# Patient Record
Sex: FEMALE | Race: WHITE | NOT HISPANIC OR LATINO | Employment: FULL TIME | URBAN - METROPOLITAN AREA
[De-identification: names, ages, dates, MRNs, and addresses within clinical notes are randomized per-mention and may not be internally consistent; named-entity substitution may affect disease eponyms.]

---

## 2018-09-24 ENCOUNTER — OFFICE VISIT (OUTPATIENT)
Dept: FAMILY MEDICINE | Facility: CLINIC | Age: 33
End: 2018-09-24

## 2018-09-24 VITALS
DIASTOLIC BLOOD PRESSURE: 72 MMHG | RESPIRATION RATE: 16 BRPM | SYSTOLIC BLOOD PRESSURE: 108 MMHG | BODY MASS INDEX: 26.23 KG/M2 | OXYGEN SATURATION: 98 % | HEART RATE: 76 BPM | TEMPERATURE: 96.8 F | HEIGHT: 65 IN | WEIGHT: 157.4 LBS

## 2018-09-24 DIAGNOSIS — G47.9 SLEEP DISORDER: Primary | ICD-10-CM

## 2018-09-24 DIAGNOSIS — F32.0 MAJOR DEPRESSIVE DISORDER, SINGLE EPISODE, MILD (H): ICD-10-CM

## 2018-09-24 DIAGNOSIS — Z71.89 ACP (ADVANCE CARE PLANNING): ICD-10-CM

## 2018-09-24 LAB
% GRANULOCYTES: 42.2 %
HCT VFR BLD AUTO: 35.4 % (ref 35–47)
HEMOGLOBIN: 11.8 G/DL (ref 11.7–15.7)
LYMPHOCYTES NFR BLD AUTO: 48.9 %
MCH RBC QN AUTO: 29.1 PG (ref 26–33)
MCHC RBC AUTO-ENTMCNC: 33.3 G/DL (ref 31–36)
MCV RBC AUTO: 87.5 FL (ref 78–100)
MONOCYTES NFR BLD AUTO: 8.9 %
PLATELET COUNT - QUEST: 311 10^9/L (ref 150–375)
RBC # BLD AUTO: 4.05 10*12/L (ref 3.8–5.2)
WBC # BLD AUTO: 4.9 10*9/L (ref 4–11)

## 2018-09-24 PROCEDURE — 85025 COMPLETE CBC W/AUTO DIFF WBC: CPT | Performed by: FAMILY MEDICINE

## 2018-09-24 PROCEDURE — 36415 COLL VENOUS BLD VENIPUNCTURE: CPT | Performed by: FAMILY MEDICINE

## 2018-09-24 PROCEDURE — 99203 OFFICE O/P NEW LOW 30 MIN: CPT | Performed by: FAMILY MEDICINE

## 2018-09-24 RX ORDER — BUPROPION HYDROCHLORIDE 300 MG/1
300 TABLET ORAL DAILY
Refills: 2 | COMMUNITY
Start: 2018-09-17

## 2018-09-24 NOTE — NURSING NOTE
Roxy Rivera is here today because: She Wakes up at least once a night (sometimes more) extremely hungry, she has to eat something (until she feels full) in order to go back to sleep. This has been going on for the past 20 years.    Pre-visit Screening:    Immunizations:  not up to date - Tdap Due (patient declines) also Declines Flu  Colonoscopy:  NA  Mammogram: NA  Asthma Action Test/Plan:  NA  PHQ9:  Patient sees Psychiatrist for Depression  GAD7:  Patient sees Psychiatrist for Anxiety    Questioned patient about current smoking habits Pt. quit smoking some time ago.    Is it ok to leave a detailed message on cell phone's voice mail for today's visit only? Yes     Telephone Information:   Mobile [phone]       Madeline Watkins [facility]

## 2018-09-24 NOTE — PATIENT INSTRUCTIONS
Await labs    Call Psychiatry and report your concerns    Schedule sleep evaluation as next step.    Continue current medications

## 2018-09-24 NOTE — MR AVS SNAPSHOT
After Visit Summary   9/24/2018    Roxy Rivera    MRN: [id number]           Patient Information     Date Of Birth          1985        Visit Information        Provider Department      9/24/2018 12:45 PM Geri Root MD [facility]        Today's Diagnoses     Sleep disorder    -  1    Major depressive disorder, single episode, mild (H)        ACP (advance care planning)          Care Instructions    Await labs    Call Psychiatry and report your concerns    Schedule sleep evaluation as next step.    Continue current medications          Follow-ups after your visit        Additional Services     SLEEP EVALUATION & MANAGEMENT REFERRAL - [facility]  [phone] (Age 18 and up)       Please be aware that coverage of these services is subject to the terms and limitations of your health insurance plan.  Call member services at your health plan with any benefit or coverage questions.      Please bring the following to your appointment:    >>   List of current medications   >>   This referral request   >>   Any documents/labs given to you for this referral                      Future tests that were ordered for you today     Open Future Orders        Priority Expected Expires Ordered    SLEEP EVALUATION & MANAGEMENT REFERRAL - [facility]  [phone] (Age 18 and up) Routine  9/24/2019 9/24/2018            Who to contact     If you have questions or need follow up information about today's clinic visit or your schedule please contact [facility] directly at [phone].  Normal or non-critical lab and imaging results will be communicated to you by MyChart, letter or phone within 4 business days after the clinic has received the results. If you do not hear from us within 7 days, please contact the clinic through MyChart or phone. If you have a critical or abnormal lab result, we will notify  "you by phone as soon as possible.  Submit refill requests through [facility] or call your pharmacy and they will forward the refill request to us. Please allow 3 business days for your refill to be completed.          Additional Information About Your Visit        Arrows[employer] Information     [facility] [facility] lets you send messages to your doctor, view your test results, renew your prescriptions, schedule appointments and more. To sign up, go to [email or web address] [facility] . Click on \"Log in\" on the left side of the screen, which will take you to the Welcome page. Then click on \"Sign up Now\" on the right side of the page.     You will be asked to enter the access code listed below, as well as some personal information. Please follow the directions to create your username and password.     Your access code is: [email or web address]  Expires: 2018 12:46 PM     Your access code will  in 90 days. If you need help or a new code, please call your [place] clinic or [phone].        Care EveryWhere ID     This is your Care EveryWhere ID. This could be used by other organizations to access your [place] medical records  [id number]        Your Vitals Were     Pulse Temperature Respirations Height Last Period Pulse Oximetry    76 96.8  F (36  C) (Oral) 16 1.638 m (5' 4.5\") 09/10/2018 (Exact Date) 98%    Breastfeeding? BMI (Body Mass Index)                No 26.6 kg/m2           Blood Pressure from Last 3 Encounters:   18 108/72   03/09/15 102/60   14 110/70    Weight from Last 3 Encounters:   18 71.4 kg (157 lb 6.4 oz)   03/09/15 65.4 kg (144 lb 3.2 oz)   14 67.6 kg (149 lb)              We Performed the Following     CL AFF HEMOGRAM/PLATE/DIFF (BFP)     IRON ([facility])     TSH with free T4 reflex ([facility])     VENOUS COLLECTION          Today's Medication Changes          These changes are accurate as of 18  1:47 PM.  If you have any questions, ask your nurse or doctor.               These medicines have changed " or have updated prescriptions.        Dose/Directions    buPROPion 300 MG 24 hr tablet   Commonly known as:  WELLBUTRIN XL   This may have changed:  Another medication with the same name was removed. Continue taking this medication, and follow the directions you see here.   Used for:  Major depressive disorder, single episode, mild (H)   Changed by:  Geri Root MD        Dose:  300 mg   Take 300 mg by mouth daily   Refills:  2                Primary Care Provider Office Phone # Fax #    Geri Root [phone]       [address] [address]        Equal Access to Services     [facility]: Hadii aad ku hadasho Soomaali, waaxda luqadaha, qaybta kaalmada adeegyada, diaz velasquez . So [facility] [phone].    ATENCIÓN: Si habla español, tiene a encinas disposición servicios gratuitos de asistencia lingüística. [facility] [phone].    We comply with applicable federal civil rights laws and [place] laws. We do not discriminate on the basis of race, color, national origin, age, disability, sex, sexual orientation, or gender identity.            Thank you!     Thank you for choosing [facility]  for your care. Our goal is always to provide you with excellent care. Hearing back from our patients is one way we can continue to improve our services. Please take a few minutes to complete the written survey that you may receive in the mail after your visit with us. Thank you!             Your Updated Medication List - Protect others around you: Learn how to safely use, store and throw away your medicines at www.disposemymeds.org.          This list is accurate as of 9/24/18  1:47 PM.  Always use your most recent med list.                   Brand Name Dispense Instructions for use Diagnosis    buPROPion 300 MG 24 hr tablet    WELLBUTRIN XL     Take 300 mg by mouth daily    Major depressive disorder, single episode, mild (H)        ZOLOFT 100 MG tablet   Generic drug:  sertraline      1 TABLET DAILY

## 2018-09-24 NOTE — PROGRESS NOTES
SUBJECTIVE: 33 year old female complaining of awaking at night starving for 15 year(s).   The patient describes episodically sleeps through the night but lately wonders if it is a medical issue with sleeping. Tries to be disciplined but still waking up and eating. Usually carb related like cereal. Up for about one hour. Then sleeps well the rest of the night. However each night this occurs she is fatigued that day. When she sleeps through the entire night she feels rested.    The patient denies a history of weight changes.  ROS: 10 point ROS neg other than the symptoms noted above in the HPI.    Smoking history: No.   Relevant past medical history: positive for anxiety and depression. Has a young child in her home.  History of psychotic depression in 2007. Was treated in the past with Lamictal. Did use trazodone at bedtime in 2003.    Past Surgical History:   Procedure Laterality Date     BIOPSY OF BREAST, NEEDLE CORE  2008    benign      C APPENDECTOMY  1999     HC CREATE EARDRUM OPENING,GEN ANESTH      P.E. Tubes     Past Medical History:   Diagnosis Date     Allergic rhinitis, cause unspecified      Depressive disorder, not elsewhere classified      Personal history of tobacco use, presenting hazards to health          OBJECTIVE: The patient appears healthy, alert, no distress, cooperative and fatigued.   EARS: negative  NOSE/SINUS: Nares normal. Septum midline. Mucosa normal. No drainage or sinus tenderness.   THROAT: normal   NECK:Neck supple. No adenopathy. Thyroid symmetric, normal size,, Carotids without bruits.   CHEST:Regular rate and  rhythm. S1 and S2 normal, no murmurs, clicks, gallops or rubs. No edema or JVD. Chest is clear; no wheezes or rales.  ABD: The abdomen is soft without tenderness, guarding, mass or organomegaly. Bowel sounds are normal. No CVA tenderness or inguinal adenopathy noted.  CNS: Cranial nerves are normal. Fundi are normal with sharp disc margins, no papilledema, hemorrhages or  exudates noted. TAYA. EOM's intact. DTR's, motor power and sensation normal and symmetric. Babinski sign absent.  Mental status normal.  Gait and station normal.  Cerebellar function is normal.    Weight charts noted      CBC: WNL    ASSESSMENT: (G47.9) Sleep disorder  (primary encounter diagnosis)  Comment: struggling sleep habit  Plan: TSH with free T4 reflex ([facility]), VENOUS         COLLECTION, CL AFF HEMOGRAM/PLATE/DIFF (BFP),         SLEEP EVALUATION & MANAGEMENT REFERRAL - [facility]          [phone] (Age 18 and up), IRON ([facility]),         CANCELED: PULMONARY MEDICINE REFERRAL        Schedule sleep evaluation and counseling. Consider a sleep study. Contact Pyschiatry  with her concerns as well and speak to their office. Await labs    (F32.0) Major depressive disorder, single episode, mild (H)  Plan: buPROPion (WELLBUTRIN XL) 300 MG 24 hr tablet,         TSH with free T4 reflex ([facility]), VENOUS         COLLECTION        I have reviewed the patient's medical history in detail and updated the computerized patient record.      (Z71.89) ACP (advance care planning)  Plan: I have reviewed the patient's medical history in detail and updated the computerized patient record.    Epic PMH updated.

## 2018-09-24 NOTE — LETTER
September 25, 2018      Roxy Rivera  [address] [address]        Dear ,    We are writing to inform you of your test results.    Your test results fall within the expected range(s).  Please continue with current treatment plan.    Resulted Orders   TSH with free T4 reflex ([facility])   Result Value Ref Range    TSH 2.41 mIU/L      Comment:                Reference Range                         > or = 20 Years  0.40-4.50                              Pregnancy Ranges            First trimester    0.26-2.66            Second trimester   0.55-2.73            Third trimester    0.43-2.91     CL AFF HEMOGRAM/PLATE/DIFF (BFP)   Result Value Ref Range    WBC 4.9 4.0 - 11 10*9/L    RBC Count 4.05 3.8 - 5.2 10*12/L    Hemoglobin 11.8 11.7 - 15.7 g/dL    Hematocrit 35.4 35.0 - 47.0 %    MCV 87.5 78 - 100 fL    MCH 29.1 26 - 33 pg    MCHC 33.3 31 - 36 g/dL    Platelet Count 311 150 - 375 10^9/L    % Granulocytes 42.2 %    % Lymphocytes 48.9 %    % Monocytes 8.9 %   IRON ([facility])   Result Value Ref Range    Iron 70 40 - 190 mcg/dL       If you have any questions or concerns, please call the clinic at the number listed above.       Sincerely,        Geri Root MD

## 2018-09-25 LAB
IRON: 70 MCG/DL (ref 40–190)
TSH SERPL-ACNC: 2.41 MIU/L

## 2018-10-05 ENCOUNTER — TELEPHONE (OUTPATIENT)
Dept: FAMILY MEDICINE | Facility: CLINIC | Age: 33
End: 2018-10-05

## 2018-10-05 DIAGNOSIS — G47.9 SLEEP DISORDER: Primary | ICD-10-CM

## 2018-10-05 NOTE — TELEPHONE ENCOUNTER
Patient called in and is wondering if Dr. Root would put in an order for a stool test to see if she has any parasites. She has had a parasite before when she was in a teenager and when speaking to her psychiatrist, he told her that maybe this is something she should look into. Routing to Dr. Root to get opinion and see if doing this test is a possibility. Patient is aware that she will not hear back from us until next week when Dr. Root is back into office.    Patient call back number is [phone]

## 2018-10-08 NOTE — TELEPHONE ENCOUNTER
Talked to LES   Come in and  O&P/Stool culture vials/kit   Will call pt later  Cindy  [phone] (home)

## 2021-03-18 ENCOUNTER — VIRTUAL VISIT (OUTPATIENT)
Dept: SLEEP MEDICINE | Facility: CLINIC | Age: 36
End: 2021-03-18
Payer: COMMERCIAL

## 2021-03-18 DIAGNOSIS — E66.3 OVERWEIGHT: ICD-10-CM

## 2021-03-18 DIAGNOSIS — G47.10 HYPERSOMNIA: ICD-10-CM

## 2021-03-18 DIAGNOSIS — R06.83 SNORING: Primary | ICD-10-CM

## 2021-03-18 PROCEDURE — 99202 OFFICE O/P NEW SF 15 MIN: CPT | Mod: 95 | Performed by: PEDIATRICS

## 2021-03-18 NOTE — PROGRESS NOTES
"Roxy Rivera is a 35 year old female being evaluated via a billable telephone visit.     \"This telephone visit will be conducted via a call between you and your physician/provider. We have found that certain health care needs can be provided without the need for an in-person visit or physical exam.  This service lets us provide the care you need with a telephone conversation.  If a prescription is necessary we can send it directly to your pharmacy.  If lab work is needed we can place an order for that and you can then stop by our lab to have the test done at a later time.\"    Telephone visits are billed at different rates depending on your insurance coverage.  Please reach out to your insurance provider with any questions.    Patient has given verbal consent for  a Telephone visit? Yes    What telephone number would you like your provider to contact at at:       How would you like to obtain your AVS? Marcia Reeder MD    Telephone Visit Details:     Telephone Visit Start Time: 1:00 PM    Telephone Visit End Time:1:19 PM      [facility]  Outpatient Sleep Medicine Consultation, New Patient      Name: Roxy Rivera MRN# [id number]   Age: 35 year old YOB: 1985     Date of Consultation: March 18, 2021  Consultation is requested by: No referring provider defined for this encounter.  Primary care provider: Geri Root      Assessment and Plan:     1. Snoring  Patient is being evaluated for Obstructive Sleep Apnea (GAL).  Risk factors for GAL include:  snoring, excessive daytime sleepiness/subjective tiredness, and family history of obstructive sleep apnea.  Recommend HST since patient is at risk for GAL without any relevant comorbid conditions. She will follow up with me after her sleep study has been completed to review the results and discuss plan of care.    2. Hypersomnia  The patient is advised to avoid driving, operating any heavy machinery or " engaging in other hazardous situations while drowsy or sleepy.    3. Overweight  Patients with overweight/obesity are at higher risk for sleep apnea due to fat deposition in the posterior airway and tongue.  Sleep disruption associated with untreated sleep disorders (including, but not limited to, sleep apnea) can cause hormonal disruption that predisposes individuals to gain weight.  Weight loss can lead to improvement in sleep apnea severity and sleep quality.      History:     Roxy Rivera is a 35 year old female whose visit was conducted via telephone today.  She reports that her son observes snoring during her sleep but apneic events have not been witnessed.  Sleep apnea runs in her family (mother, father, brother).  Patient feels tired all the time although she remains functional during the day.  She denies drowsy driving.  It seems that she can differentiate tiredness from sleepiness and favors a description of feeling tired.  She does not perform any shift work.      She gets into bed at 9 PM and is asleep by 10:30 PM.  She denies insomnia.  She has 1 awakening on a nightly basis because of a surge in her appetite.  She awakens feeling hungry and is unable to return to sleep unless she eats something.  This behavior has been present since high school.  I confirmed that she is always awake when she is eating.  Additionally, she does not eat nonfood items or unpalatable food.  She is awake for 15 or 30 minutes before going back to bed.  Her scheduled wake up time is at 7 AM but she finds it difficult to get out of bed before 8 AM.    Medical history is significant for depression which has been quite stable on medication.  She is overweight with a BMI of about 29 kg/m .  She notes that her weight has increased over the last few years and she attributes this to her nighttime eating.  She stopped smoking approximately 2 or 3 years ago and was never a heavy smoker.    Medications:     Current Outpatient  Medications   Medication Sig     buPROPion (WELLBUTRIN XL) 300 MG 24 hr tablet Take 300 mg by mouth daily     ZOLOFT 100 MG OR TABS 1 TABLET DAILY     No current facility-administered medications for this visit.         Allergies   Allergen Reactions     Cephalosporins        Past Medical History:     Past Medical History:   Diagnosis Date     Allergic rhinitis, cause unspecified      Depressive disorder, not elsewhere classified      Personal history of tobacco use, presenting hazards to health         Past Surgical History:      Past Surgical History:   Procedure Laterality Date     BIOPSY OF BREAST, NEEDLE CORE  2008    benign      C APPENDECTOMY  1999     HC CREATE EARDRUM OPENING,GEN ANESTH      P.E. Tubes       Physical Examination:   There were no vitals taken for this visit.           Data: All pertinent previous laboratory data reviewed     Lab Results   Component Value Date    PH 7.0 09/10/2008    PH 6.5 06/05/2006     Lab Results   Component Value Date    TSH 2.41 09/24/2018    TSH 1.77 11/07/2014     Lab Results   Component Value Date    GLC 85 11/07/2014    GLC 83 09/11/2008     Lab Results   Component Value Date    HGB 11.8 09/24/2018    HGB 12.0 11/07/2014     Lab Results   Component Value Date    BUN 10 11/07/2014    BUN NOT APPLICABLE 11/07/2014    CR 0.68 11/07/2014    CR 0.74 09/11/2008     Lab Results   Component Value Date    AST 21 11/07/2014    AST 18 09/11/2008    ALT 15 11/07/2014    ALT 14 09/11/2008    ALKPHOS 62 11/07/2014    ALKPHOS 67 09/11/2008    BILITOTAL 0.4 11/07/2014    BILITOTAL 0.4 09/11/2008     Lab Results   Component Value Date    UAMP Negative 06/26/2005    UBARB Negative 06/26/2005    BENZODIAZEUR Negative 06/26/2005    UCANN Negative 06/26/2005    UCOC Negative 06/26/2005    OPIT Negative 06/26/2005    UPCP Negative 06/26/2005       Lori Reeder MD   3/18/2021   [address], [address]  [place] [address]    [phone]    Copy to: Geri Root

## 2021-03-23 ENCOUNTER — MYC MEDICAL ADVICE (OUTPATIENT)
Dept: SLEEP MEDICINE | Facility: CLINIC | Age: 36
End: 2021-03-23

## 2021-03-23 NOTE — TELEPHONE ENCOUNTER
----- Message from Nisa Antonio CMA sent at 3/22/2021  8:14 AM CDT -----  Regarding: schedule HST and return visit with Dr. Cain  Insurance: [facility]

## 2021-03-23 NOTE — TELEPHONE ENCOUNTER
Roxy has been scheduled for HST 5/12/21 at () location. Telephone visit has been scheduled 5/26/21 with Dr. Ant Reeder for test results.   Instructional letter sent to patient through [employer].     Dionne REY CMA, [facility], 3/23/2021 11:27 AM

## 2021-05-08 ENCOUNTER — HEALTH MAINTENANCE LETTER (OUTPATIENT)
Age: 36
End: 2021-05-08

## 2021-05-12 ENCOUNTER — OFFICE VISIT (OUTPATIENT)
Dept: SLEEP MEDICINE | Facility: CLINIC | Age: 36
End: 2021-05-12
Attending: PEDIATRICS
Payer: COMMERCIAL

## 2021-05-12 DIAGNOSIS — G47.10 HYPERSOMNIA: ICD-10-CM

## 2021-05-12 DIAGNOSIS — R06.83 SNORING: ICD-10-CM

## 2021-05-13 ENCOUNTER — DOCUMENTATION ONLY (OUTPATIENT)
Dept: SLEEP MEDICINE | Facility: CLINIC | Age: 36
End: 2021-05-13
Payer: COMMERCIAL

## 2021-05-13 NOTE — PROGRESS NOTES
HST POST-STUDY QUESTIONNAIRE    1. What time did you go to bed?  12:00 a.m.  2. How long do you think it took to fall asleep?  2 hrs  3. What time did you wake up to start the day?  9:00 a.m.  4. Did you get up during the night at all?  yes  5. If you woke up, do you remember approximately what time(s)? 2:00 a.m.  6. Did you have any difficulty with the equipment?  No  7. Did you us any type of treatment with this study?  None  8. Was the head of the bed elevated? No  9. Did you sleep in a recliner?  No  10. Did you stop using CPAP at least 3 days before this test?  NA  11. Any other information you'd like us to know? Only wore for two hours - felt she couldn't sleep with it.

## 2021-05-25 ENCOUNTER — TELEPHONE (OUTPATIENT)
Dept: SLEEP MEDICINE | Facility: CLINIC | Age: 36
End: 2021-05-25

## 2021-05-25 NOTE — TELEPHONE ENCOUNTER
Dr. Albert FORBES called patient to get her checked in for her telephone visit with you tomorrow (5/26) for the results of her HST.  When talking to her, patient said the night of her study she could not sleep with the device on her and eventually took it off after 2 hours.  She is worried there isn't enough adequate data for you to go off on.  Please advise on what we should do.  Do you have enough data? Should patient repeat?

## 2021-05-25 NOTE — TELEPHONE ENCOUNTER
Spoke to Sleep Tech, patient will have to repeat HST.  Will call patient and reschedule appointment.

## 2021-05-26 ENCOUNTER — TELEPHONE (OUTPATIENT)
Dept: SLEEP MEDICINE | Facility: CLINIC | Age: 36
End: 2021-05-26

## 2021-10-23 ENCOUNTER — HEALTH MAINTENANCE LETTER (OUTPATIENT)
Age: 36
End: 2021-10-23

## 2022-06-04 ENCOUNTER — HEALTH MAINTENANCE LETTER (OUTPATIENT)
Age: 37
End: 2022-06-04

## 2022-09-12 ENCOUNTER — LAB REQUISITION (OUTPATIENT)
Dept: LAB | Facility: CLINIC | Age: 37
End: 2022-09-12
Payer: COMMERCIAL

## 2022-09-12 LAB
HBA1C MFR BLD: 5.8 %
TSH SERPL DL<=0.005 MIU/L-ACNC: 2.65 UIU/ML (ref 0.3–4.2)

## 2022-09-12 PROCEDURE — 83036 HEMOGLOBIN GLYCOSYLATED A1C: CPT | Mod: ORL | Performed by: OBSTETRICS & GYNECOLOGY

## 2022-09-12 PROCEDURE — 84443 ASSAY THYROID STIM HORMONE: CPT | Mod: ORL | Performed by: OBSTETRICS & GYNECOLOGY

## 2022-10-10 ENCOUNTER — HEALTH MAINTENANCE LETTER (OUTPATIENT)
Age: 37
End: 2022-10-10

## 2022-12-09 ENCOUNTER — LAB REQUISITION (OUTPATIENT)
Dept: LAB | Facility: CLINIC | Age: 37
End: 2022-12-09
Payer: COMMERCIAL

## 2022-12-09 DIAGNOSIS — R73.03 PREDIABETES: ICD-10-CM

## 2022-12-09 LAB — HBA1C MFR BLD: 6.1 %

## 2022-12-09 PROCEDURE — 83036 HEMOGLOBIN GLYCOSYLATED A1C: CPT | Mod: ORL | Performed by: OBSTETRICS & GYNECOLOGY

## 2023-06-10 ENCOUNTER — HEALTH MAINTENANCE LETTER (OUTPATIENT)
Age: 38
End: 2023-06-10

## 2023-07-27 ENCOUNTER — TRANSFERRED RECORDS (OUTPATIENT)
Dept: HEALTH INFORMATION MANAGEMENT | Facility: CLINIC | Age: 38
End: 2023-07-27
Payer: COMMERCIAL

## 2023-07-27 LAB
ALT SERPL-CCNC: 19 U/L (ref 6–29)
AST SERPL-CCNC: 21 U/L (ref 10–30)
CREATININE (EXTERNAL): 0.78 MG/DL (ref 0.5–0.97)
GFR ESTIMATED (EXTERNAL): 99 ML/MIN/1.73M2
GLUCOSE (EXTERNAL): 86 MG/DL (ref 65–99)
POTASSIUM (EXTERNAL): 4.6 MMOL/L (ref 3.5–5.3)
TSH SERPL-ACNC: 2.67 MIU/L (ref 0.4–4.5)

## 2023-08-07 ENCOUNTER — MEDICAL CORRESPONDENCE (OUTPATIENT)
Dept: HEALTH INFORMATION MANAGEMENT | Facility: CLINIC | Age: 38
End: 2023-08-07
Payer: COMMERCIAL

## 2023-08-07 ENCOUNTER — TRANSFERRED RECORDS (OUTPATIENT)
Dept: HEALTH INFORMATION MANAGEMENT | Facility: CLINIC | Age: 38
End: 2023-08-07
Payer: COMMERCIAL

## 2023-08-08 ENCOUNTER — TRANSCRIBE ORDERS (OUTPATIENT)
Dept: OTHER | Age: 38
End: 2023-08-08

## 2023-08-08 DIAGNOSIS — R73.03 PREDIABETES: Primary | ICD-10-CM

## 2023-08-08 DIAGNOSIS — E28.1 ANDROGEN EXCESS: ICD-10-CM

## 2024-02-14 ENCOUNTER — HOSPITAL ENCOUNTER (OUTPATIENT)
Dept: MAMMOGRAPHY | Facility: CLINIC | Age: 39
Discharge: HOME | End: 2024-02-14
Attending: FAMILY MEDICINE
Payer: COMMERCIAL

## 2024-02-14 ENCOUNTER — HOSPITAL ENCOUNTER (OUTPATIENT)
Dept: ULTRASOUND IMAGING | Facility: CLINIC | Age: 39
Discharge: HOME | End: 2024-02-14
Attending: FAMILY MEDICINE
Payer: COMMERCIAL

## 2024-02-14 DIAGNOSIS — N63.10 LUMP OF RIGHT BREAST: ICD-10-CM

## 2024-02-14 PROCEDURE — 77062 BREAST TOMOSYNTHESIS BI: CPT

## 2024-02-14 PROCEDURE — 76642 ULTRASOUND BREAST LIMITED: CPT | Mod: RT

## 2024-08-03 ENCOUNTER — HEALTH MAINTENANCE LETTER (OUTPATIENT)
Age: 39
End: 2024-08-03

## 2025-08-16 ENCOUNTER — HEALTH MAINTENANCE LETTER (OUTPATIENT)
Age: 40
End: 2025-08-16